# Patient Record
Sex: MALE | URBAN - METROPOLITAN AREA
[De-identification: names, ages, dates, MRNs, and addresses within clinical notes are randomized per-mention and may not be internally consistent; named-entity substitution may affect disease eponyms.]

---

## 2018-01-15 ENCOUNTER — RECORDS - HEALTHEAST (OUTPATIENT)
Dept: LAB | Facility: CLINIC | Age: 53
End: 2018-01-15

## 2018-01-15 LAB
ALBUMIN SERPL-MCNC: 3.8 G/DL (ref 3.5–5)
ALP SERPL-CCNC: 102 U/L (ref 45–120)
ALT SERPL W P-5'-P-CCNC: 44 U/L (ref 0–45)
ANION GAP SERPL CALCULATED.3IONS-SCNC: 10 MMOL/L (ref 5–18)
AST SERPL W P-5'-P-CCNC: 31 U/L (ref 0–40)
BILIRUB SERPL-MCNC: 0.3 MG/DL (ref 0–1)
BUN SERPL-MCNC: 18 MG/DL (ref 8–22)
CALCIUM SERPL-MCNC: 9.4 MG/DL (ref 8.5–10.5)
CHLORIDE BLD-SCNC: 101 MMOL/L (ref 98–107)
CHOLEST SERPL-MCNC: 195 MG/DL
CO2 SERPL-SCNC: 26 MMOL/L (ref 22–31)
CREAT SERPL-MCNC: 1.05 MG/DL (ref 0.7–1.3)
FASTING STATUS PATIENT QL REPORTED: NORMAL
GFR SERPL CREATININE-BSD FRML MDRD: >60 ML/MIN/1.73M2
GLUCOSE BLD-MCNC: 330 MG/DL (ref 70–125)
HDLC SERPL-MCNC: 59 MG/DL
LDLC SERPL CALC-MCNC: 118 MG/DL
POTASSIUM BLD-SCNC: 4.2 MMOL/L (ref 3.5–5)
PROT SERPL-MCNC: 7.2 G/DL (ref 6–8)
SODIUM SERPL-SCNC: 137 MMOL/L (ref 136–145)
TRIGL SERPL-MCNC: 92 MG/DL

## 2018-01-17 LAB — HCV RNA DETECT/QUANT, S: NORMAL IU/ML

## 2019-07-29 ENCOUNTER — RECORDS - HEALTHEAST (OUTPATIENT)
Dept: LAB | Facility: CLINIC | Age: 54
End: 2019-07-29

## 2019-07-29 LAB
ANION GAP SERPL CALCULATED.3IONS-SCNC: 10 MMOL/L (ref 5–18)
BUN SERPL-MCNC: 15 MG/DL (ref 8–22)
CALCIUM SERPL-MCNC: 9.3 MG/DL (ref 8.5–10.5)
CHLORIDE BLD-SCNC: 109 MMOL/L (ref 98–107)
CHOLEST SERPL-MCNC: 148 MG/DL
CO2 SERPL-SCNC: 22 MMOL/L (ref 22–31)
CREAT SERPL-MCNC: 0.87 MG/DL (ref 0.7–1.3)
FASTING STATUS PATIENT QL REPORTED: NORMAL
GFR SERPL CREATININE-BSD FRML MDRD: >60 ML/MIN/1.73M2
GLUCOSE BLD-MCNC: 107 MG/DL (ref 70–125)
HDLC SERPL-MCNC: 47 MG/DL
LDLC SERPL CALC-MCNC: 85 MG/DL
POTASSIUM BLD-SCNC: 3.9 MMOL/L (ref 3.5–5)
SODIUM SERPL-SCNC: 141 MMOL/L (ref 136–145)
TRIGL SERPL-MCNC: 80 MG/DL

## 2020-02-04 ENCOUNTER — OFFICE VISIT (OUTPATIENT)
Dept: ORTHOPEDICS | Facility: CLINIC | Age: 55
End: 2020-02-04
Payer: COMMERCIAL

## 2020-02-04 ENCOUNTER — ANCILLARY PROCEDURE (OUTPATIENT)
Dept: GENERAL RADIOLOGY | Facility: CLINIC | Age: 55
End: 2020-02-04
Attending: FAMILY MEDICINE
Payer: COMMERCIAL

## 2020-02-04 VITALS — WEIGHT: 175 LBS | BODY MASS INDEX: 26.52 KG/M2 | HEIGHT: 68 IN

## 2020-02-04 DIAGNOSIS — M25.531 ACUTE PAIN OF RIGHT WRIST: ICD-10-CM

## 2020-02-04 DIAGNOSIS — M67.431 GANGLION CYST OF VOLAR ASPECT OF RIGHT WRIST: Primary | ICD-10-CM

## 2020-02-04 RX ORDER — GABAPENTIN 300 MG/1
300 CAPSULE ORAL 2 TIMES DAILY
COMMUNITY
Start: 2018-01-15

## 2020-02-04 RX ORDER — CLOPIDOGREL BISULFATE 75 MG/1
75 TABLET ORAL DAILY
COMMUNITY

## 2020-02-04 RX ORDER — METOPROLOL SUCCINATE 25 MG/1
TABLET, EXTENDED RELEASE ORAL
COMMUNITY
Start: 2020-01-13 | End: 2021-02-18

## 2020-02-04 RX ORDER — ASPIRIN 81 MG/1
81 TABLET, CHEWABLE ORAL DAILY
COMMUNITY
Start: 2019-07-29

## 2020-02-04 RX ORDER — METOPROLOL SUCCINATE 25 MG/1
25 TABLET, EXTENDED RELEASE ORAL DAILY
COMMUNITY
Start: 2019-07-24 | End: 2021-02-20

## 2020-02-04 RX ORDER — GLIPIZIDE 10 MG/1
20 TABLET ORAL DAILY
COMMUNITY

## 2020-02-04 RX ORDER — CEPHALEXIN 500 MG/1
CAPSULE ORAL
COMMUNITY
Start: 2020-01-20 | End: 2021-02-18

## 2020-02-04 RX ORDER — DOXYCYCLINE 100 MG/1
CAPSULE ORAL
COMMUNITY
Start: 2020-01-20 | End: 2021-02-18

## 2020-02-04 RX ORDER — ATORVASTATIN CALCIUM 80 MG/1
80 TABLET, FILM COATED ORAL DAILY
COMMUNITY

## 2020-02-04 RX ADMIN — TRIAMCINOLONE ACETONIDE 40 MG: 40 INJECTION, SUSPENSION INTRA-ARTICULAR; INTRAMUSCULAR at 14:18

## 2020-02-04 RX ADMIN — LIDOCAINE HYDROCHLORIDE 2 ML: 10 INJECTION, SOLUTION EPIDURAL; INFILTRATION; INTRACAUDAL; PERINEURAL at 14:18

## 2020-02-04 ASSESSMENT — MIFFLIN-ST. JEOR: SCORE: 1598.29

## 2020-02-04 NOTE — NURSING NOTE
[address] [address]  Dept: [phone]  ______________________________________________________________________________    Patient: Trevor Shannon   : 1965   MRN: [id number]   2020    INVASIVE PROCEDURE SAFETY CHECKLIST    Date: 2020  Procedure: Right wrist cyst aspiration and kenalog injection  Patient Name: Trevor Shannon  MRN: [id number]  YOB: 1965    Action: Complete sections as appropriate. Any discrepancy results in a HARD COPY until resolved.     PRE PROCEDURE:  Patient ID verified with 2 identifiers (name and  or MRN): Yes  Procedure and site verified with patient/designee (when able): Yes  Accurate consent documentation in medical record: Yes  H&P (or appropriate assessment) documented in medical record: Yes  H&P must be up to 20 days prior to procedure and updates within 24 hours of procedure as applicable: NA  Relevant diagnostic and radiology test results appropriately labeled and displayed as applicable: Yes  Procedure site(s) marked with provider initials: Yes    TIMEOUT:  Time-Out performed immediately prior to starting procedure, including verbal and active participation of all team members addressing the following:Yes  * Correct patient identify  * Confirmed that the correct side and site are marked  * An accurate procedure consent form  * Agreement on the procedure to be done  * Correct patient position  * Relevant images and results are properly labeled and appropriately displayed  * The need to administer antibiotics or fluids for irrigation purposes during the procedure as applicable   * Safety precautions based on patient history or medication use    DURING PROCEDURE: Verification of correct person, site, and procedures any time the responsibility for care of the patient is transferred to another member of the care team.     The following medication was given:     MEDICATION:   Kenalog 40 mg  ROUTE: intra-articular  SITE: right Wrist  DOSE: 1/2mL  LOT #: [id number]  : [employer]  EXPIRATION DATE: 10/2021  NDC#: [address]-6   Was there drug waste? Yes  Amount of drug waste (mL): 1/2.  Reason for waste:  As per MD    MEDICATION:  Lidocaine without epinephrine  ROUTE: intra-articular  SITE: right Wrist  DOSE: 2.5mL  LOT #: [id number]  : [employer]  EXPIRATION DATE: 10/2023  NDC#: 97600-277-49   Was there drug waste? Yes  Amount of drug waste (mL): 2.5.  Reason for waste:  As per MD    Prior to injection, verified patient identity using patient's name and date of birth.  Due to injection administration, patient instructed to remain in clinic for 15 minutes  afterwards, and to report any adverse reaction to me immediately.    Jessie Pool, ATC  February 4, 2020

## 2020-02-04 NOTE — PROGRESS NOTES
SPORTS & ORTHOPEDIC WALK-IN VISIT 2/4/2020    Primary Care Physician: Dr. Lao    About a week before his visit to the ED 1/20/20, he noticed pain on the volar aspect of his R wrist. They suspected a ganglion cyst, they prescribed antibiotics which he had finished.    When asked where pain is, he gestures to the 1st CMC joint as well as into the other carpal bones on the radial aspect of his wrist.  Mostly notices the pain when he is using his wrist consistently.  Notices some increased stiffness after activity.    Reason for visit:     What part of your body is injured / painful?  right wrist    What caused the injury /pain? Overuse injury from regular activity    How long ago did your injury occur or pain begin? a week ago    What are your most bothersome symptoms? Pain and Swelling    How would you characterize your symptom?  aching, dull and sharp    What makes your symptoms better? Rest    What makes your symptoms worse? Movement    Have you been previously seen for this problem? ED    Medical History:    Any recent changes to your medical history? No    Any new medication prescribed since last visit? No    Have you had surgery on this body part before? No    Social History:    Occupation: house keeping    Handedness: Right    Exercise: None    Review of Systems:    Do you have fever, chills, weight loss? No    Do you have any vision problems? No    Do you have any chest pain or edema? No    Do you have any shortness of breath or wheezing?  No    Do you have stomach problems? No    Do you have any numbness or focal weakness? No    Do you have diabetes? Yes, Type 2    Do you have problems with bleeding or clotting? No    Do you have an rashes or other skin lesions? No

## 2020-02-04 NOTE — LETTER
2/4/2020       RE: Trevor Shannon  [address]  [place] [address]     Dear Colleague,    Thank you for referring your patient, Trevor Shannon, to the [facility] SPORTS AND ORTHOPAEDIC WALK IN CLINIC at [facility]. Please see a copy of my visit note below.          SPORTS & ORTHOPEDIC WALK-IN VISIT 2/4/2020    Primary Care Physician: Dr. Lao    About a week before his visit to the ED 1/20/20, he noticed pain on the volar aspect of his R wrist. They suspected a ganglion cyst, they prescribed antibiotics which he had finished.    When asked where pain is, he gestures to the 1st CMC joint as well as into the other carpal bones on the radial aspect of his wrist.  Mostly notices the pain when he is using his wrist consistently.  Notices some increased stiffness after activity.    Reason for visit:     What part of your body is injured / painful?  right wrist    What caused the injury /pain? Overuse injury from regular activity    How long ago did your injury occur or pain begin? a week ago    What are your most bothersome symptoms? Pain and Swelling    How would you characterize your symptom?  aching, dull and sharp    What makes your symptoms better? Rest    What makes your symptoms worse? Movement    Have you been previously seen for this problem? ED    Medical History:    Any recent changes to your medical history? No    Any new medication prescribed since last visit? No    Have you had surgery on this body part before? No    Social History:    Occupation: house keeping    Handedness: Right    Exercise: None    Review of Systems:    Do you have fever, chills, weight loss? No    Do you have any vision problems? No    Do you have any chest pain or edema? No    Do you have any shortness of breath or wheezing?  No    Do you have stomach problems? No    Do you have any numbness or focal weakness? No    Do you have diabetes? Yes, Type 2    Do you have problems with  bleeding or clotting? No    Do you have an rashes or other skin lesions? No           [facility]  Orthopedics  Pj Vasques,   2020     Name: Trevor Shannon  MRN: [id number]  Age: 54 year old  : 1965  Referring provider: Referred Self     Chief Complaint: Pain of the Right Hand     Date of Injury: one week ago    History of Present Illness:   Trevor Shannon is a 54 year old male who presents today for evaluation of right hand sustained one week ago. He was last evaluated in the ED by Dr. Gonzalez, at which time he was diagnosed with a ganglion cyst of the volar aspect of the right wrist, small abscess of scalp for which he was prescribed antibiotics. He endorses aching, dull, and sharp pain in the 1st CMC joint as well as into the other carpal bones on the radial aspect of the right wrist. He notes increased stiffness after activity. Pain is exacerbated with movement. Pain is alleviated with rest.     Review of Systems:   A 10-point review of systems was obtained and is negative except for as noted in the HPI.     Medications:      aspirin (ASA) 81 MG chewable tablet, 1 tab(s), Disp: , Rfl:      atorvastatin (LIPITOR) 80 MG tablet, 1 tab(s), Disp: , Rfl:      cephALEXin (KEFLEX) 500 MG capsule, , Disp: , Rfl:      clopidogrel (PLAVIX) 75 MG tablet, 1 tab(s), Disp: , Rfl:      doxycycline monohydrate (MONODOX) 100 MG capsule, , Disp: , Rfl:      gabapentin (NEURONTIN) 300 MG capsule, 1 cap(s), Disp: , Rfl:      glipiZIDE (GLUCOTROL) 10 MG tablet, 2 tab(s), Disp: , Rfl:      metoprolol succinate ER (TOPROL-XL) 25 MG 24 hr tablet, 1 tab(s), Disp: , Rfl:      metoprolol succinate ER (TOPROL-XL) 25 MG 24 hr tablet, , Disp: , Rfl:      sitagliptin (JANUVIA) 100 MG tablet, 1 tab(s), Disp: , Rfl:     Allergies:  Patient has no known allergies.     Past Medical History:  The patient denies any significant past medical history.    Past Surgical History:  The patient does not have any pertinent past surgical  "history.    Social History:  He denies tobacco use and denies alcohol use.     Family History:  No past pertinent family history.    Physical Examination:  Height 1.727 m (5' 8\"), weight 79.4 kg (175 lb).  General  - normal appearance, in no obvious distress  CV  - normal radial pulse  Pulm  - normal respiratory pattern, non-labored  Musculoskeletal - right wrist  - inspection: No skin discoloration. There is a 1.5 by 1 cm nodule at the volar aspect of the wrist   - palpation: Tenderness is mild, overlying the nodule at volar aspect of wrist.  - ROM:  90 deg flexion   70 deg extension   25 deg abduction   65 deg adduction  - strength: 5/5  strength, 5/5 flexion, extension, pronation, supination, adduction, abduction  Neuro  - no sensory or motor deficit, grossly normal coordination, normal muscle tone  Skin  - no ecchymosis, erythema, warmth, or induration, no obvious rash  Psych  - interactive, appropriate, normal mood and affect    Imaging:   Radiographs of the right wrist - 3 views (02/04/2020)  No acute osseous normality. Mild first carpometacarpal and  triscaphe joint degenerative change.    US LTD IN OFFICE - Hypoechoic focus, cystic appearing walled structure eminating from tendon sheath at volar aspect of wrist. No color or doppler flow to suggest vascular etiology.      I have independently reviewed the above imaging studies; the results were discussed with the patient.     Assessment:   54 year old male presenting with nodule of right hand over the past four weeks. Clinical examination and ultrasound evaluation consistent with ganglion cyst which appears to originate from flexor tendon rather than joint. Treatment option discussed including surgical removal vs. Attempt with aspiration/injection.      Plan:   - Aspiration and corticosteroid injection of the ganglion cyst.  - Compression with coban over the next 10 days.  - Ice  - Follow-up with cyst reoccurs and we will discuss referral to hand surgeon. "       Ganglion Cyst Aspiration and Injection - Ultrasound Guided  The patient was informed of the risks and the benefits of the procedure and a written consent was signed.  The patient s right wist was prepped with chlorhexidine in sterile fashion.   20 mg of triamcinolone suspension was drawn up into a 3mL syringe with 0.5mL of 1% lidocaine.  Procedure was performed using sterile technique.  Local anesthesia was performed using a 27-gauge 1.5-inch needle to administer 2 mL of 1% lidocaine without epi.  Under ultrasound guidance a 1.5-inch 18-gauge needle on a 10 cc syringe was used to enter the volar aspect of right wrist.  Needle placement was visualized and documented with ultrasound.  Ultrasound visualization was necessary due to ensure proper placement of needle in to the cyst, aid in avoiding vascular structures, as well as resolution of cyst once aspiration was completed. 1cc of clear yellow gelatinous material was aspirated.  Syringe was then swapped for the 3mL syringe containing the corticosteroid injectate, holding the needle in place with a sterile hemostat.  Injection was performed successfully, without difficulty.  Images were permanently stored for the patient's record.  There were no complications. The patient tolerated the procedure well. There was negligible bleeding.   The wrist was wrapped with compression - coban.  The patient was instructed to ice and continue coban use x 10 days.  The patient was instructed to call or go to the emergency room with any unusual pain, swelling, redness, or if otherwise concerned  IPj DO, have reviewed the above note and agree with the scribe's notation as written.    Scribe Disclosure:  Aaron SIBLEY, am serving as a scribe to document services personally performed by Pj Vasques DO at this visit, based upon the provider's statements to me. All documentation has been reviewed by the aforementioned provider prior to being entered into the  official medical record.        Medium Joint Injection/Arthrocentesis: R radiocarpal cyst  Date/Time: 2/4/2020 2:18 PM  Performed by: Pj Vasques DO  Authorized by: Pj Vasques DO     Indications:  Pain and joint swelling  Needle Size:  18 G  Needle Size comment:  27g for lido  Guidance: ultrasound    Approach:  Anterior  Location:  Wrist  Site:  R radiocarpal  Medications:  40 mg triamcinolone 40 MG/ML; 2 mL lidocaine (PF) 1 %  Procedure discussed: discussed risks, benefits, and alternatives    Consent Given by:  Patient  Timeout: timeout called immediately prior to procedure    Prep: patient was prepped and draped in usual sterile fashion            Again, thank you for allowing me to participate in the care of your patient.      Sincerely,    Pj Vasques DO

## 2020-02-04 NOTE — PROGRESS NOTES
Medium Joint Injection/Arthrocentesis: R radiocarpal cyst  Date/Time: 2/4/2020 2:18 PM  Performed by: Pj Vasques DO  Authorized by: Pj Vasques DO     Indications:  Pain and joint swelling  Needle Size:  18 G  Needle Size comment:  27g for lido  Guidance: ultrasound    Approach:  Anterior  Location:  Wrist  Site:  R radiocarpal  Medications:  40 mg triamcinolone 40 MG/ML; 2 mL lidocaine (PF) 1 %  Procedure discussed: discussed risks, benefits, and alternatives    Consent Given by:  Patient  Timeout: timeout called immediately prior to procedure    Prep: patient was prepped and draped in usual sterile fashion

## 2020-02-04 NOTE — PROGRESS NOTES
[facility]  Orthopedics  Pj Vasques, DO  2020     Name: Trevor Shannon  MRN: [id number]  Age: 54 year old  : 1965  Referring provider: Referred Self     Chief Complaint: Pain of the Right Hand     Date of Injury: one week ago    History of Present Illness:   Trevor Shannon is a 54 year old male who presents today for evaluation of right hand sustained one week ago. He was last evaluated in the ED by Dr. Gonzalez, at which time he was diagnosed with a ganglion cyst of the volar aspect of the right wrist, small abscess of scalp for which he was prescribed antibiotics. He endorses aching, dull, and sharp pain in the 1st CMC joint as well as into the other carpal bones on the radial aspect of the right wrist. He notes increased stiffness after activity. Pain is exacerbated with movement. Pain is alleviated with rest.     Review of Systems:   A 10-point review of systems was obtained and is negative except for as noted in the HPI.     Medications:      aspirin (ASA) 81 MG chewable tablet, 1 tab(s), Disp: , Rfl:      atorvastatin (LIPITOR) 80 MG tablet, 1 tab(s), Disp: , Rfl:      cephALEXin (KEFLEX) 500 MG capsule, , Disp: , Rfl:      clopidogrel (PLAVIX) 75 MG tablet, 1 tab(s), Disp: , Rfl:      doxycycline monohydrate (MONODOX) 100 MG capsule, , Disp: , Rfl:      gabapentin (NEURONTIN) 300 MG capsule, 1 cap(s), Disp: , Rfl:      glipiZIDE (GLUCOTROL) 10 MG tablet, 2 tab(s), Disp: , Rfl:      metoprolol succinate ER (TOPROL-XL) 25 MG 24 hr tablet, 1 tab(s), Disp: , Rfl:      metoprolol succinate ER (TOPROL-XL) 25 MG 24 hr tablet, , Disp: , Rfl:      sitagliptin (JANUVIA) 100 MG tablet, 1 tab(s), Disp: , Rfl:     Allergies:  Patient has no known allergies.     Past Medical History:  The patient denies any significant past medical history.    Past Surgical History:  The patient does not have any pertinent past surgical history.    Social History:  He denies tobacco use and denies alcohol use.     Family  "History:  No past pertinent family history.    Physical Examination:  Height 1.727 m (5' 8\"), weight 79.4 kg (175 lb).  General  - normal appearance, in no obvious distress  CV  - normal radial pulse  Pulm  - normal respiratory pattern, non-labored  Musculoskeletal - right wrist  - inspection: No skin discoloration. There is a 1.5 by 1 cm nodule at the volar aspect of the wrist   - palpation: Tenderness is mild, overlying the nodule at volar aspect of wrist.  - ROM:  90 deg flexion   70 deg extension   25 deg abduction   65 deg adduction  - strength: 5/5  strength, 5/5 flexion, extension, pronation, supination, adduction, abduction  Neuro  - no sensory or motor deficit, grossly normal coordination, normal muscle tone  Skin  - no ecchymosis, erythema, warmth, or induration, no obvious rash  Psych  - interactive, appropriate, normal mood and affect    Imaging:   Radiographs of the right wrist - 3 views (02/04/2020)  No acute osseous normality. Mild first carpometacarpal and  triscaphe joint degenerative change.    US LTD IN OFFICE - Hypoechoic focus, cystic appearing walled structure eminating from tendon sheath at volar aspect of wrist. No color or doppler flow to suggest vascular etiology.      I have independently reviewed the above imaging studies; the results were discussed with the patient.     Assessment:   54 year old male presenting with nodule of right hand over the past four weeks. Clinical examination and ultrasound evaluation consistent with ganglion cyst which appears to originate from flexor tendon rather than joint. Treatment option discussed including surgical removal vs. Attempt with aspiration/injection.      Plan:   - Aspiration and corticosteroid injection of the ganglion cyst.  - Compression with coban over the next 10 days.  - Ice  - Follow-up with cyst reoccurs and we will discuss referral to hand surgeon.       Ganglion Cyst Aspiration and Injection - Ultrasound Guided  The patient was " informed of the risks and the benefits of the procedure and a written consent was signed.  The patient s right wist was prepped with chlorhexidine in sterile fashion.   20 mg of triamcinolone suspension was drawn up into a 3mL syringe with 0.5mL of 1% lidocaine.  Procedure was performed using sterile technique.  Local anesthesia was performed using a 27-gauge 1.5-inch needle to administer 2 mL of 1% lidocaine without epi.  Under ultrasound guidance a 1.5-inch 18-gauge needle on a 10 cc syringe was used to enter the volar aspect of right wrist.  Needle placement was visualized and documented with ultrasound.  Ultrasound visualization was necessary due to ensure proper placement of needle in to the cyst, aid in avoiding vascular structures, as well as resolution of cyst once aspiration was completed. 1cc of clear yellow gelatinous material was aspirated.  Syringe was then swapped for the 3mL syringe containing the corticosteroid injectate, holding the needle in place with a sterile hemostat.  Injection was performed successfully, without difficulty.  Images were permanently stored for the patient's record.  There were no complications. The patient tolerated the procedure well. There was negligible bleeding.   The wrist was wrapped with compression - coban.  The patient was instructed to ice and continue coban use x 10 days.  The patient was instructed to call or go to the emergency room with any unusual pain, swelling, redness, or if otherwise concerned  IPj DO, have reviewed the above note and agree with the scribe's notation as written.    Scribe Disclosure:  Aaron SIBLEY, am serving as a scribe to document services personally performed by Pj Vasques DO at this visit, based upon the provider's statements to me. All documentation has been reviewed by the aforementioned provider prior to being entered into the official medical record.

## 2020-02-06 RX ORDER — LIDOCAINE HYDROCHLORIDE 10 MG/ML
2 INJECTION, SOLUTION EPIDURAL; INFILTRATION; INTRACAUDAL; PERINEURAL
Status: DISCONTINUED | OUTPATIENT
Start: 2020-02-04 | End: 2021-02-18

## 2020-02-06 RX ORDER — TRIAMCINOLONE ACETONIDE 40 MG/ML
40 INJECTION, SUSPENSION INTRA-ARTICULAR; INTRAMUSCULAR
Status: DISCONTINUED | OUTPATIENT
Start: 2020-02-04 | End: 2021-02-18

## 2020-08-19 ENCOUNTER — RECORDS - HEALTHEAST (OUTPATIENT)
Dept: LAB | Facility: CLINIC | Age: 55
End: 2020-08-19

## 2020-08-19 LAB
ANION GAP SERPL CALCULATED.3IONS-SCNC: 9 MMOL/L (ref 5–18)
BUN SERPL-MCNC: 20 MG/DL (ref 8–22)
CALCIUM SERPL-MCNC: 9.4 MG/DL (ref 8.5–10.5)
CHLORIDE BLD-SCNC: 105 MMOL/L (ref 98–107)
CHOLEST SERPL-MCNC: 160 MG/DL
CO2 SERPL-SCNC: 25 MMOL/L (ref 22–31)
CREAT SERPL-MCNC: 1.17 MG/DL (ref 0.7–1.3)
FASTING STATUS PATIENT QL REPORTED: ABNORMAL
GFR SERPL CREATININE-BSD FRML MDRD: >60 ML/MIN/1.73M2
GLUCOSE BLD-MCNC: 241 MG/DL (ref 70–125)
HDLC SERPL-MCNC: 42 MG/DL
LDLC SERPL CALC-MCNC: 86 MG/DL
POTASSIUM BLD-SCNC: 5.3 MMOL/L (ref 3.5–5)
SODIUM SERPL-SCNC: 139 MMOL/L (ref 136–145)
TRIGL SERPL-MCNC: 160 MG/DL

## 2021-02-18 ENCOUNTER — TRANSFERRED RECORDS (OUTPATIENT)
Dept: HEALTH INFORMATION MANAGEMENT | Facility: CLINIC | Age: 56
End: 2021-02-18

## 2021-02-18 ENCOUNTER — OFFICE VISIT (OUTPATIENT)
Dept: PHARMACY | Facility: PHYSICIAN GROUP | Age: 56
End: 2021-02-18
Payer: COMMERCIAL

## 2021-02-18 VITALS
SYSTOLIC BLOOD PRESSURE: 94 MMHG | HEIGHT: 68 IN | WEIGHT: 177.6 LBS | DIASTOLIC BLOOD PRESSURE: 60 MMHG | BODY MASS INDEX: 26.92 KG/M2 | HEART RATE: 88 BPM

## 2021-02-18 DIAGNOSIS — I25.2 OLD MYOCARDIAL INFARCTION: ICD-10-CM

## 2021-02-18 DIAGNOSIS — M54.30 SCIATICA, UNSPECIFIED LATERALITY: ICD-10-CM

## 2021-02-18 DIAGNOSIS — E78.5 HYPERLIPIDEMIA LDL GOAL <100: ICD-10-CM

## 2021-02-18 DIAGNOSIS — E11.9 TYPE 2 DIABETES MELLITUS WITHOUT COMPLICATION, WITHOUT LONG-TERM CURRENT USE OF INSULIN (H): Primary | ICD-10-CM

## 2021-02-18 DIAGNOSIS — I11.9 HYPERTENSIVE HEART DISEASE WITHOUT HEART FAILURE: ICD-10-CM

## 2021-02-18 LAB — HBA1C MFR BLD: 10.5 % (ref 4.2–6.1)

## 2021-02-18 PROCEDURE — 99605 MTMS BY PHARM NP 15 MIN: CPT | Performed by: PHARMACIST

## 2021-02-18 PROCEDURE — 99607 MTMS BY PHARM ADDL 15 MIN: CPT | Performed by: PHARMACIST

## 2021-02-18 ASSESSMENT — MIFFLIN-ST. JEOR: SCORE: 1601.12

## 2021-02-18 NOTE — PROGRESS NOTES
Medication Therapy Management (MTM) Encounter    ASSESSMENT:                            Medication Adherence/Access: No issues identified    Type 2 Diabetes:   Patient is not meeting A1c goal of < 7%. Self monitoring of blood glucose is not at goal of fasting  mg/dL. Patient would benefit from starting either an SGLT2 inhibitor or GLP1 agonist.  The only GLP1 agonists his insurance covers are Bydureon or Victoza, and patient would rather take oral meds right now.  Rybelsus would be a great option due to reduced CVD risk, however isn't covered.  Would be beneficial to try a SGLT2 inhibitor.  Could start Jardiance 10 mg daily, and recheck BMP in 2-3 weeks.  Would continue both Januvia and glipizide until BG start improving, but could consider stopping glipizide at next A1c check or if BG are decreasing significantly.  Due for annual eye exam and microalbumin.  Aspirin therapy is indicated in this patient at dose of 81mg daily. Pt is taking with no problems..    Hyperlipidemia:   Stable.  Patient is on high intensity statin which is indicated based on 2019 ACC/AHA guidelines for lipid management.      MI and Stents/Hypertension:   Hypertension is stable. Patient is meeting blood pressure goal of < 130/80mmHg.  Patient is 3 years post-MI, and may not need a beta blocker anymore. With his BP being low (below 100/70), and we are starting Jardiance, should stop the metoprolol to prevent hypotension.  Patient is also 1 year post stent placement, and likely doesn't need to be on clopidogrel.  He hasn't seen a cardiologist in the last year, and said he wouldn't want to schedule a follow up with one.     Sciatic pain:   Stable.    PLAN:                            1.  Recommend starting Jardiance 10 mg once daily, and rechecking BMP in 2-3 weeks.  2.  Recommend stopping Metoprolol when patient starts Jardiance to prevent low blood pressure.  3.  Recommend doing microalbumin at next lab visit, recommended yearly.  4.   "Future considerations:  Consider reassessing the need for clopidogrel since he is over 1 year post MI/stents.    Follow-up: 1 month on home BG, 2-3 weeks for BMP recheck and microalbumin     SUBJECTIVE/OBJECTIVE:                          Trevor Shannon is a 55 year old male coming in for an initial visit. He was referred to me from Sravani Brooks.      Reason for visit: Initial medication review.  Patient referred for help with diabetes management.    Allergies/ADRs: None  Tobacco: He reports that he has been smoking cigarettes. He has never used smokeless tobacco.Tobacco Cessation Action Plan:   Information offered: Patient not interested at this time  Alcohol: not currently using  Caffeine: 1-2 cups/day of coffee  Past Medical History: Reviewed in chart    Medication Adherence/Access: no issues reported    Type 2 Diabetes:   Currently taking Glipizide 20 mg daily, and Januvia 100 mg daily. Patient is not experiencing side effects.  Patient was on metformin  mg twice daily, but stopped it due to diarrhea.  He also read online that \"it'll kill you\".  Discussed risks and side effects with patient.  Blood sugar monitoring: on occassion, not checking daily Ranges (patient reported): usually in the 200's when he checks, AM or later in the day.  Doesn't check much because it hurts his fingers.  Symptoms of low blood sugar? shaky, weak, sweaty, Frequency of lows- rare  Symptoms of high blood sugar? polydipsia, polyuria and fatigue  Eye exam: due  Foot exam: up to date  Diet/Exercise: Like breads and rice with meals.  Declines diabetic ed appointments.  Aspirin: Taking 81mg daily and denies side effects  Statin: Yes: Atorvastatin 80 mg daily   ACEi/ARB: No, last potassium a little above normal range.   Urine Albumin: <30, 2018.    GLYCOSYLATED HGB A1C - 02/18/2021      NAME VALUE REFERENCE RANGE LAB   F HGB A1C 10.5  H         Hyperlipidemia:   Current therapy includes Atorvastatin 80 mg daily.  Patient " "reports no significant myalgias or other side effects.    LIPID CASCADE - 08/19/2020      NAME VALUE REFERENCE RANGE    CHOLESTEROL 160 <=199 (mg/dL)    TRIGLYCERIDES 160  H <=149 (mg/dL)    HDL CHOLESTEROL 42 >=40 (mg/dL)    LDL CHOLESTEROL CALCULATED 86 <=129 (mg/dL)     - lipid panel WNL  MI and Stents/Hypertension:   Patient is currently taking Aspirin 81 mg and Clopidogrel 75 mg daily for antiplatelet therapy. Patient reports no current concerns of bruising or bleeding. Patient does not have a history of GI bleed.   Pt had MI and stents placed 8/2016.  Pt said he hasn't met with a cardiologist recently, and wouldn't want to.  Said getting to their clinic is too hard.  He has cardiac stents and 2 femoral stents.  Patient is also taking Metoprolol ER 25 mg daily. Patient reports no current medication side effects.   Patient does not self-monitor blood pressure.    BP Readings from Last 3 Encounters:   02/18/21 94/60       Sciatic pain:   Taking gabapentin 300 mg twice daily.  Denies side effects.  Pt used to get shooting pains down his left leg from his hip, but since starting gabapentin doesn't occur often.  Pt said he doesn't get numbness or tingling in his fingers or toes.    Today's Vitals: BP 94/60   Pulse 88   Ht 5' 7.75\" (1.721 m)   Wt 177 lb 9.6 oz (80.6 kg)   BMI 27.20 kg/m    ----------------      I spent 30 minutes with this patient today. All changes were made via verbal approval with Sravani Lao. A copy of the visit note was provided to the patient's primary care provider.    The patient was mailed a summary of these recommendations.     Daphney Larsen, Marlene  Medication Therapy Management Pharmacist  Pager: [phone]    "

## 2021-02-20 NOTE — PATIENT INSTRUCTIONS
Recommendations from today's MTM visit:                                                    MTM (medication therapy management) is a service provided by a clinical pharmacist designed to help you get the most of out of your medicines.   Today we reviewed what your medicines are for, how to know if they are working, that your medicines are safe and how to make your medicine regimen as easy as possible.      1.  Start Jardiance 10 mg once daily in the morning.    2.  When you start taking Jardiance, stop taking Metoprolol.  Your blood pressure is very low most of the time, and Jardiance can decrease blood pressure some.  We want to make sure your blood pressure doesn't drop too much, so you won't need the metoprolol anymore.      It was great to speak with you today.  I value your experience and would be very thankful for your time with providing feedback on our clinic survey. You may receive a survey via email or text message in the next few days.     Lab Appointment:  3/11/2021 at 8:00 AM  Next MTM visit: 3/18/2021 at 8:30 AM telephone appointment    To schedule another MTM appointment, please call the clinic directly or you may call the MTM scheduling line at [phone] or toll-free at [phone].     My Clinical Pharmacist's contact information:                                                      It was a pleasure talking with you today!  Please feel free to contact me with any questions or concerns you have.      Daphney Larsen, Marlene  Medication Therapy Management Pharmacist  Pager: [phone]

## 2021-03-11 ENCOUNTER — RECORDS - HEALTHEAST (OUTPATIENT)
Dept: LAB | Facility: CLINIC | Age: 56
End: 2021-03-11

## 2021-03-11 LAB
ANION GAP SERPL CALCULATED.3IONS-SCNC: 10 MMOL/L (ref 5–18)
BUN SERPL-MCNC: 23 MG/DL (ref 8–22)
CALCIUM SERPL-MCNC: 9.4 MG/DL (ref 8.5–10.5)
CHLORIDE BLD-SCNC: 109 MMOL/L (ref 98–107)
CO2 SERPL-SCNC: 27 MMOL/L (ref 22–31)
CREAT SERPL-MCNC: 1.27 MG/DL (ref 0.7–1.3)
CREAT UR-MCNC: 75.4 MG/DL
GFR SERPL CREATININE-BSD FRML MDRD: 58 ML/MIN/1.73M2
GLUCOSE BLD-MCNC: 171 MG/DL (ref 70–125)
MICROALBUMIN UR-MCNC: 5.71 MG/DL (ref 0–1.99)
MICROALBUMIN/CREAT UR: 75.7 MG/G
POTASSIUM BLD-SCNC: 4.3 MMOL/L (ref 3.5–5)
SODIUM SERPL-SCNC: 146 MMOL/L (ref 136–145)

## 2021-03-18 ENCOUNTER — VIRTUAL VISIT (OUTPATIENT)
Dept: PHARMACY | Facility: PHYSICIAN GROUP | Age: 56
End: 2021-03-18
Payer: COMMERCIAL

## 2021-03-18 DIAGNOSIS — E11.9 TYPE 2 DIABETES MELLITUS WITHOUT COMPLICATION, WITHOUT LONG-TERM CURRENT USE OF INSULIN (H): Primary | ICD-10-CM

## 2021-03-18 DIAGNOSIS — M54.30 SCIATICA, UNSPECIFIED LATERALITY: ICD-10-CM

## 2021-03-18 DIAGNOSIS — I11.9 HYPERTENSIVE HEART DISEASE WITHOUT HEART FAILURE: ICD-10-CM

## 2021-03-18 DIAGNOSIS — G44.209 TENSION HEADACHE: ICD-10-CM

## 2021-03-18 DIAGNOSIS — I25.2 OLD MYOCARDIAL INFARCTION: ICD-10-CM

## 2021-03-18 PROCEDURE — 99607 MTMS BY PHARM ADDL 15 MIN: CPT | Mod: TEL | Performed by: PHARMACIST

## 2021-03-18 PROCEDURE — 99606 MTMS BY PHARM EST 15 MIN: CPT | Mod: TEL | Performed by: PHARMACIST

## 2021-03-18 RX ORDER — COVID-19 ANTIGEN TEST
220 KIT MISCELLANEOUS 2 TIMES DAILY PRN
COMMUNITY

## 2021-03-18 NOTE — PROGRESS NOTES
Medication Therapy Management (MTM) Encounter    ASSESSMENT:                            Medication Adherence/Access: See below for considerations    Type 2 Diabetes:   Patient is not meeting A1c goal of < 7%. Self monitoring of blood glucose is not at goal of fasting  mg/dL.  He is doing well so far on Jardiance, his renal function was slightly lower but not significant and safe to continue Jardiance.  Reviewed his BMP and microalbumin labs with him today.  If needed, could increase Jardiance to 25 mg at next appointment and would need to recheck BMP 2-3 weeks later.  Only had one home BG reading, but much improved from before starting Jardiance.    MI and Stents/Hypertension:   Hypertension is stable. Patient is meeting blood pressure goal of < 130/80mmHg.  Patient is 3 years post-MI, and doesn't need a beta blocker anymore since BP well controlled.  Pt hadn't stopped taking the metoprolol, and although not having side effects recommended he still stop it to prevent hypotension.  His last BP was below 100 mmHg systolic and usually on low end of normal.   Patient is also 1 year post stent placement, and likely doesn't need to be on clopidogrel.  He hasn't seen a cardiologist in the last year, and said he wouldn't want to schedule a follow up with one.  Discussed with Sravani and she will discuss with patient in the future.     Sciatic pain:   Taking aleve with aspirin and clopidogrel increases his risk for bleeding issues.  It also increases the risk for renal complications. Patient's renal function is slightly lower, and has microalbuminuria.  Discussed his labs with with, and most likely cause of lower kidney function is diabetes.  Discussed that aleve can also cause harm to the kidneys, and recommended he limit use as able.  Discussed trying Tylenol, but he declines due to the Tylenol Murders that happened in [place] in the 80's.  Said he does not trust tylenol or feel comfortable trying it, even if they  "improved the packaging to tamper resistant and it was a single individual--not the company.    PLAN:                            1.  Stop taking metoprolol, it is no longer needed at this time.    2.  We discussed trying to limit use of Aleve as able.  Aleve can cause harm to the kidneys if taken long term, and can also increase the risk of bleeding.  When you also take aspirin and clopidogrel for your heart, aleve can further increase the risk of bleeding.      Follow-up: 3 months, after next A1c check    SUBJECTIVE/OBJECTIVE:                          Trevor Shannon is a 55 year old male called for a follow-up visit. He was referred to me from Sravani Lao.  Today's visit is a follow-up MTM visit from 2/18/2021     Reason for visit: Follow up on diabetes and new start Jardiance.      Medication Adherence/Access: no issues reported      Type 2 Diabetes:   Currently taking Glipizide 20 mg daily, Jardiance 10 mg, Januvia 100 mg daily. Patient is not experiencing side effects.  Patient was on metformin  mg twice daily, but stopped it due to diarrhea.  He also read online that \"it'll kill you\".  Discussed risks and side effects with patient.  Blood sugar monitoring: on occassion, not checking daily Ranges (patient reported):   2 days ago 141, but doesn't recall any other BG readings.  Hasn't been able to check every day, has been very busy.  Symptoms of low blood sugar? shaky, weak, sweaty, Frequency of lows- rare  Symptoms of high blood sugar? polydipsia, polyuria and fatigue  Eye exam: due  Foot exam: up to date  Diet/Exercise: Like breads and rice with meals.  Declines diabetic ed appointments.  Aspirin: Taking 81mg daily and denies side effects  Statin: Yes: Atorvastatin 80 mg daily   ACEi/ARB: No, last potassium a little above normal range.   Urine Albumin: <30, 2018.    Hemoglobin A1C   Date Value Ref Range Status   02/18/2021 10.5 (A) 4.2 - 6.1 % Final       MICROALBUMIN, RANDOM UR ([facility]) - 03/11/2021 "      NAME VALUE REFERENCE RANGE    MICROALBUMIN, RANDOM URINE 5.71  H 0.00-1.99 (mg/dL)    CREATININE, RANDOM URINE 75.4 (mg/dL)    MICROALBUMIN/CREATININE RATIO RANDOM URINE 75.7  H <=19.9 (mg/g)       MI and Stents/Hypertension:   Patient is currently taking Aspirin 81 mg and Clopidogrel 75 mg daily for antiplatelet therapy. Patient reports no current concerns of bruising or bleeding. Patient does not have a history of GI bleed.   Pt had MI and stents placed 8/2016.  Pt said he hasn't met with a cardiologist recently, and wouldn't want to.  Said getting to their clinic is too hard.  He has cardiac stents and 2 femoral stents.  Patient is also taking Metoprolol ER 25 mg daily, said he didn't get my voicemail or recall talking about stopping it.  Let him know he can stop the Metoprolol, he will do this. Patient reports no current medication side effects, hasn't felt dizzy/lightheaded even though he has been taking metoprolol after starting Jardiance.  Patient does not self-monitor blood pressure.         BP Readings from Last 3 Encounters:   02/18/21 94/60         Sciatic pain:   Taking gabapentin 300 mg twice daily.  Denies side effects.  Pt used to get shooting pains down his left leg from his hip, but since starting gabapentin doesn't occur often.  Pt said he doesn't get numbness or tingling in his fingers or toes.    He does use Aleve 220 mg once in the morning and one at night, he uses it for headaches, pain in feet/arms.  He gets headaches every other week.  The Aleve helps a lot with headaches, may get those every other week.  He hasn't tried tylenol, doesn't trust it.  He mentioned the Tylenol Murders that happened in [place] in 1980's, and said he is not comfortable taking Tylenol because of that.  [place]    He also has Meliza Aspirin 81 mg for pain, and wondering if that would be better to take than Aleve.      BASIC METABOLIC PROFILE - 03/11/2021      NAME VALUE REFERENCE  RANGE    SODIUM 146  H 136-145 (mmol/L)    POTASSIUM 4.3 3.5-5.0 (mmol/L)    CHLORIDE 109  H  (mmol/L)    CO2 27 22-31 (mmol/L)    ANION GAP, CALCULATION 10 5-18 (mmol/L)    GLUCOSE 171  H  (mg/dL)    CALCIUM 9.4 8.5-10.5 (mg/dL)    BLOOD UREA NITROGEN 23  H 8-22 (mg/dL)    CREATININE 1.27 0.70-1.30 (mg/dL)    GFR MDRD AF AMER >60 >60 (mL/min/1.73m2)    GFR MDRD NON AF AMER 58  L >60 (mL/min/1.73m2)     - Creatinine increased slightly from last check, but <30% increase.  Other values slightly out of range, but not significant.      Today's Vitals: There were no vitals taken for this visit. -  telephone appointment  ----------------      I spent 28 minutes with this patient today. I offer these suggestions for consideration by Sravani Lao. A copy of the visit note was provided to the patient's primary care provider.    The patient was mailed a summary of these recommendations.     Daphney Larsen PharmD  Medication Therapy Management Pharmacist  Pager: [phone]      Telemedicine Visit Details  Type of service:  Telephone visit  Start Time:  8:31 AM  End Time: 8:59 AM  Originating Location (patient location): [place]  Distant Location (provider location):  [facility]

## 2021-03-18 NOTE — PATIENT INSTRUCTIONS
Recommendations from today's MTM visit:                                                    MTM (medication therapy management) is a service provided by a clinical pharmacist designed to help you get the most of out of your medicines.   Today we reviewed what your medicines are for, how to know if they are working, that your medicines are safe and how to make your medicine regimen as easy as possible.      1.  Stop taking Metoprolol ER 25 mg tablets.  The Jardiance we started for diabetes can also help lower blood pressure, so you will no longer need to take the Metoprolol since your blood pressure is always very good.      It was great to speak with you today.  I value your experience and would be very thankful for your time with providing feedback on our clinic survey. You may receive a survey via email or text message in the next few days.     Next MTM visit: 3 months    To schedule another MTM appointment, please call the clinic directly or you may call the MTM scheduling line at [phone] or toll-free at [phone].     My Clinical Pharmacist's contact information:                                                      It was a pleasure talking with you today!  Please feel free to contact me with any questions or concerns you have.      Daphney Larsen, PharmD  Medication Therapy Management Pharmacist  Pager: [phone]

## 2021-05-20 ENCOUNTER — TRANSFERRED RECORDS (OUTPATIENT)
Dept: HEALTH INFORMATION MANAGEMENT | Facility: CLINIC | Age: 56
End: 2021-05-20

## 2021-05-20 LAB — HBA1C MFR BLD: 8.3 % (ref 4.2–6.1)

## 2021-09-24 ENCOUNTER — LAB REQUISITION (OUTPATIENT)
Dept: LAB | Facility: CLINIC | Age: 56
End: 2021-09-24

## 2021-09-24 ENCOUNTER — TRANSFERRED RECORDS (OUTPATIENT)
Dept: HEALTH INFORMATION MANAGEMENT | Facility: CLINIC | Age: 56
End: 2021-09-24
Payer: COMMERCIAL

## 2021-09-24 DIAGNOSIS — G62.89 OTHER SPECIFIED POLYNEUROPATHIES: ICD-10-CM

## 2021-09-24 LAB
HBA1C MFR BLD: 8.5 % (ref 4.2–6.1)
VIT B12 SERPL-MCNC: 339 PG/ML (ref 213–816)

## 2021-09-24 PROCEDURE — 82607 VITAMIN B-12: CPT | Performed by: NURSE PRACTITIONER

## 2021-11-18 ENCOUNTER — TRANSFERRED RECORDS (OUTPATIENT)
Dept: HEALTH INFORMATION MANAGEMENT | Facility: CLINIC | Age: 56
End: 2021-11-18
Payer: COMMERCIAL

## 2021-11-18 LAB — HBA1C MFR BLD: 8.1 % (ref 4.2–6.1)

## 2021-12-30 ENCOUNTER — LAB REQUISITION (OUTPATIENT)
Dept: LAB | Facility: CLINIC | Age: 56
End: 2021-12-30

## 2021-12-30 DIAGNOSIS — E78.2 MIXED HYPERLIPIDEMIA: ICD-10-CM

## 2021-12-30 DIAGNOSIS — I10 ESSENTIAL (PRIMARY) HYPERTENSION: ICD-10-CM

## 2021-12-30 LAB
ANION GAP SERPL CALCULATED.3IONS-SCNC: 11 MMOL/L (ref 5–18)
BUN SERPL-MCNC: 23 MG/DL (ref 8–22)
CALCIUM SERPL-MCNC: 10.1 MG/DL (ref 8.5–10.5)
CHLORIDE BLD-SCNC: 110 MMOL/L (ref 98–107)
CHOLEST SERPL-MCNC: 164 MG/DL
CO2 SERPL-SCNC: 22 MMOL/L (ref 22–31)
CREAT SERPL-MCNC: 1.16 MG/DL (ref 0.7–1.3)
FASTING STATUS PATIENT QL REPORTED: ABNORMAL
GFR SERPL CREATININE-BSD FRML MDRD: 73 ML/MIN/1.73M2
GLUCOSE BLD-MCNC: 149 MG/DL (ref 70–125)
HDLC SERPL-MCNC: 52 MG/DL
LDLC SERPL CALC-MCNC: 75 MG/DL
POTASSIUM BLD-SCNC: 4.4 MMOL/L (ref 3.5–5)
SODIUM SERPL-SCNC: 143 MMOL/L (ref 136–145)
TRIGL SERPL-MCNC: 187 MG/DL

## 2021-12-30 PROCEDURE — 80048 BASIC METABOLIC PNL TOTAL CA: CPT | Performed by: NURSE PRACTITIONER

## 2021-12-30 PROCEDURE — 80061 LIPID PANEL: CPT | Performed by: NURSE PRACTITIONER

## 2022-03-31 ENCOUNTER — TRANSFERRED RECORDS (OUTPATIENT)
Dept: HEALTH INFORMATION MANAGEMENT | Facility: CLINIC | Age: 57
End: 2022-03-31

## 2022-03-31 LAB — HBA1C MFR BLD: 7.8 % (ref 4.2–6.1)

## 2022-06-30 ENCOUNTER — LAB REQUISITION (OUTPATIENT)
Dept: LAB | Facility: CLINIC | Age: 57
End: 2022-06-30

## 2022-06-30 ENCOUNTER — TRANSFERRED RECORDS (OUTPATIENT)
Dept: HEALTH INFORMATION MANAGEMENT | Facility: CLINIC | Age: 57
End: 2022-06-30

## 2022-06-30 DIAGNOSIS — R61 GENERALIZED HYPERHIDROSIS: ICD-10-CM

## 2022-06-30 DIAGNOSIS — Z12.5 ENCOUNTER FOR SCREENING FOR MALIGNANT NEOPLASM OF PROSTATE: ICD-10-CM

## 2022-06-30 LAB
ALBUMIN SERPL BCG-MCNC: 4.6 G/DL (ref 3.5–5.2)
ALP SERPL-CCNC: 102 U/L (ref 40–129)
ALT SERPL W P-5'-P-CCNC: 45 U/L (ref 10–50)
ANION GAP SERPL CALCULATED.3IONS-SCNC: 9 MMOL/L (ref 7–15)
AST SERPL W P-5'-P-CCNC: 35 U/L (ref 10–50)
BILIRUB SERPL-MCNC: 0.3 MG/DL
BUN SERPL-MCNC: 17.7 MG/DL (ref 8–23)
CALCIUM SERPL-MCNC: 9.6 MG/DL (ref 8.6–10)
CHLORIDE SERPL-SCNC: 106 MMOL/L (ref 98–107)
CREAT SERPL-MCNC: 1.1 MG/DL (ref 0.67–1.17)
CRP SERPL-MCNC: <3 MG/L
DEPRECATED HCO3 PLAS-SCNC: 27 MMOL/L (ref 22–29)
GFR SERPL CREATININE-BSD FRML MDRD: 77 ML/MIN/1.73M2
GLUCOSE SERPL-MCNC: 89 MG/DL (ref 70–99)
HBA1C MFR BLD: 7.4 % (ref 4.2–6.1)
POTASSIUM SERPL-SCNC: 4.7 MMOL/L (ref 3.4–5.3)
PROT SERPL-MCNC: 7.2 G/DL (ref 6.4–8.3)
PSA SERPL-MCNC: 0.45 NG/ML (ref 0–3.5)
SODIUM SERPL-SCNC: 142 MMOL/L (ref 136–145)
TSH SERPL DL<=0.005 MIU/L-ACNC: 1.22 UIU/ML (ref 0.3–4.2)

## 2022-06-30 PROCEDURE — G0103 PSA SCREENING: HCPCS | Performed by: NURSE PRACTITIONER

## 2022-06-30 PROCEDURE — 82374 ASSAY BLOOD CARBON DIOXIDE: CPT | Performed by: NURSE PRACTITIONER

## 2022-06-30 PROCEDURE — 86140 C-REACTIVE PROTEIN: CPT | Performed by: NURSE PRACTITIONER

## 2022-06-30 PROCEDURE — 84443 ASSAY THYROID STIM HORMONE: CPT | Performed by: NURSE PRACTITIONER

## 2022-12-06 ENCOUNTER — LAB REQUISITION (OUTPATIENT)
Dept: LAB | Facility: CLINIC | Age: 57
End: 2022-12-06

## 2022-12-06 DIAGNOSIS — E78.2 MIXED HYPERLIPIDEMIA: ICD-10-CM

## 2022-12-06 PROCEDURE — 80061 LIPID PANEL: CPT | Performed by: NURSE PRACTITIONER

## 2022-12-07 LAB
CHOLEST SERPL-MCNC: 152 MG/DL
HDLC SERPL-MCNC: 65 MG/DL
LDLC SERPL CALC-MCNC: 73 MG/DL
NONHDLC SERPL-MCNC: 87 MG/DL
TRIGL SERPL-MCNC: 68 MG/DL

## 2023-03-31 ENCOUNTER — LAB REQUISITION (OUTPATIENT)
Dept: LAB | Facility: CLINIC | Age: 58
End: 2023-03-31

## 2023-03-31 DIAGNOSIS — I10 ESSENTIAL (PRIMARY) HYPERTENSION: ICD-10-CM

## 2023-03-31 LAB
ANION GAP SERPL CALCULATED.3IONS-SCNC: 11 MMOL/L (ref 7–15)
BUN SERPL-MCNC: 22.5 MG/DL (ref 8–23)
CALCIUM SERPL-MCNC: 9.6 MG/DL (ref 8.6–10)
CHLORIDE SERPL-SCNC: 109 MMOL/L (ref 98–107)
CREAT SERPL-MCNC: 0.97 MG/DL (ref 0.67–1.17)
DEPRECATED HCO3 PLAS-SCNC: 24 MMOL/L (ref 22–29)
GFR SERPL CREATININE-BSD FRML MDRD: 90 ML/MIN/1.73M2
GLUCOSE SERPL-MCNC: 137 MG/DL (ref 70–99)
POTASSIUM SERPL-SCNC: 4.5 MMOL/L (ref 3.4–5.3)
SODIUM SERPL-SCNC: 144 MMOL/L (ref 136–145)

## 2023-03-31 PROCEDURE — 80048 BASIC METABOLIC PNL TOTAL CA: CPT | Performed by: NURSE PRACTITIONER

## 2024-01-16 ENCOUNTER — LAB REQUISITION (OUTPATIENT)
Dept: LAB | Facility: CLINIC | Age: 59
End: 2024-01-16

## 2024-01-16 DIAGNOSIS — E78.2 MIXED HYPERLIPIDEMIA: ICD-10-CM

## 2024-01-16 DIAGNOSIS — E11.43 TYPE 2 DIABETES MELLITUS WITH DIABETIC AUTONOMIC (POLY)NEUROPATHY (H): ICD-10-CM

## 2024-01-16 DIAGNOSIS — I10 ESSENTIAL (PRIMARY) HYPERTENSION: ICD-10-CM

## 2024-01-16 DIAGNOSIS — Z12.5 ENCOUNTER FOR SCREENING FOR MALIGNANT NEOPLASM OF PROSTATE: ICD-10-CM

## 2024-01-16 PROCEDURE — 82570 ASSAY OF URINE CREATININE: CPT | Performed by: NURSE PRACTITIONER

## 2024-01-16 PROCEDURE — G0103 PSA SCREENING: HCPCS | Performed by: NURSE PRACTITIONER

## 2024-01-16 PROCEDURE — 80048 BASIC METABOLIC PNL TOTAL CA: CPT | Performed by: NURSE PRACTITIONER

## 2024-01-16 PROCEDURE — 80061 LIPID PANEL: CPT | Performed by: NURSE PRACTITIONER

## 2024-01-17 LAB
ANION GAP SERPL CALCULATED.3IONS-SCNC: 11 MMOL/L (ref 7–15)
BUN SERPL-MCNC: 18.5 MG/DL (ref 8–23)
CALCIUM SERPL-MCNC: 9.2 MG/DL (ref 8.8–10.2)
CHLORIDE SERPL-SCNC: 107 MMOL/L (ref 98–107)
CHOLEST SERPL-MCNC: 167 MG/DL
CREAT SERPL-MCNC: 1.08 MG/DL (ref 0.67–1.17)
CREAT UR-MCNC: 69.3 MG/DL
DEPRECATED HCO3 PLAS-SCNC: 23 MMOL/L (ref 22–29)
EGFRCR SERPLBLD CKD-EPI 2021: 79 ML/MIN/1.73M2
FASTING STATUS PATIENT QL REPORTED: NORMAL
GLUCOSE SERPL-MCNC: 188 MG/DL (ref 70–99)
HDLC SERPL-MCNC: 67 MG/DL
LDLC SERPL CALC-MCNC: 73 MG/DL
MICROALBUMIN UR-MCNC: 30.5 MG/L
MICROALBUMIN/CREAT UR: 44.01 MG/G CR (ref 0–17)
NONHDLC SERPL-MCNC: 100 MG/DL
POTASSIUM SERPL-SCNC: 4.5 MMOL/L (ref 3.4–5.3)
PSA SERPL DL<=0.01 NG/ML-MCNC: 0.44 NG/ML (ref 0–4.5)
SODIUM SERPL-SCNC: 141 MMOL/L (ref 135–145)
TRIGL SERPL-MCNC: 134 MG/DL

## 2025-01-14 ENCOUNTER — LAB REQUISITION (OUTPATIENT)
Dept: LAB | Facility: CLINIC | Age: 60
End: 2025-01-14

## 2025-01-14 DIAGNOSIS — Z12.5 ENCOUNTER FOR SCREENING FOR MALIGNANT NEOPLASM OF PROSTATE: ICD-10-CM

## 2025-01-14 DIAGNOSIS — I10 ESSENTIAL (PRIMARY) HYPERTENSION: ICD-10-CM

## 2025-01-14 DIAGNOSIS — E78.2 MIXED HYPERLIPIDEMIA: ICD-10-CM

## 2025-01-14 DIAGNOSIS — E11.43 TYPE 2 DIABETES MELLITUS WITH DIABETIC AUTONOMIC (POLY)NEUROPATHY (H): ICD-10-CM

## 2025-01-14 LAB
ANION GAP SERPL CALCULATED.3IONS-SCNC: 10 MMOL/L (ref 7–15)
BUN SERPL-MCNC: 18.5 MG/DL (ref 8–23)
CALCIUM SERPL-MCNC: 9.6 MG/DL (ref 8.8–10.4)
CHLORIDE SERPL-SCNC: 106 MMOL/L (ref 98–107)
CHOLEST SERPL-MCNC: 159 MG/DL
CREAT SERPL-MCNC: 1.11 MG/DL (ref 0.67–1.17)
CREAT UR-MCNC: 65.5 MG/DL
EGFRCR SERPLBLD CKD-EPI 2021: 76 ML/MIN/1.73M2
FASTING STATUS PATIENT QL REPORTED: ABNORMAL
FASTING STATUS PATIENT QL REPORTED: NORMAL
GLUCOSE SERPL-MCNC: 184 MG/DL (ref 70–99)
HCO3 SERPL-SCNC: 25 MMOL/L (ref 22–29)
HDLC SERPL-MCNC: 61 MG/DL
LDLC SERPL CALC-MCNC: 79 MG/DL
MICROALBUMIN UR-MCNC: 20.4 MG/L
MICROALBUMIN/CREAT UR: 31.15 MG/G CR (ref 0–17)
NONHDLC SERPL-MCNC: 98 MG/DL
POTASSIUM SERPL-SCNC: 4.6 MMOL/L (ref 3.4–5.3)
PSA SERPL DL<=0.01 NG/ML-MCNC: 0.38 NG/ML (ref 0–4.5)
SODIUM SERPL-SCNC: 141 MMOL/L (ref 135–145)
TRIGL SERPL-MCNC: 93 MG/DL

## 2025-01-14 PROCEDURE — 82570 ASSAY OF URINE CREATININE: CPT | Performed by: NURSE PRACTITIONER

## 2025-01-14 PROCEDURE — 82043 UR ALBUMIN QUANTITATIVE: CPT | Performed by: NURSE PRACTITIONER

## 2025-01-14 PROCEDURE — 80048 BASIC METABOLIC PNL TOTAL CA: CPT | Performed by: NURSE PRACTITIONER

## 2025-01-14 PROCEDURE — 80061 LIPID PANEL: CPT | Performed by: NURSE PRACTITIONER

## 2025-01-14 PROCEDURE — G0103 PSA SCREENING: HCPCS | Performed by: NURSE PRACTITIONER

## (undated) RX ORDER — LIDOCAINE HYDROCHLORIDE 10 MG/ML
INJECTION, SOLUTION EPIDURAL; INFILTRATION; INTRACAUDAL; PERINEURAL
Status: DISPENSED
Start: 2020-02-04

## (undated) RX ORDER — TRIAMCINOLONE ACETONIDE 40 MG/ML
INJECTION, SUSPENSION INTRA-ARTICULAR; INTRAMUSCULAR
Status: DISPENSED
Start: 2020-02-04